# Patient Record
Sex: MALE | Race: ASIAN | NOT HISPANIC OR LATINO | URBAN - METROPOLITAN AREA
[De-identification: names, ages, dates, MRNs, and addresses within clinical notes are randomized per-mention and may not be internally consistent; named-entity substitution may affect disease eponyms.]

---

## 2022-03-14 ENCOUNTER — PREPPED CHART (OUTPATIENT)
Dept: URBAN - METROPOLITAN AREA CLINIC 80 | Facility: CLINIC | Age: 57
End: 2022-03-14

## 2022-03-14 PROBLEM — E11.3513 DIABETES, TYPE II WITH OCULAR COMPLICATIONS: Status: STABILIZING | Noted: 2022-03-14

## 2022-03-14 PROBLEM — H43.12 VITREOUS HEMORRHAGE: Noted: 2022-03-14

## 2022-03-14 PROBLEM — H33.192 BULLOUS RETINOSCHISIS: Noted: 2022-03-14

## 2022-03-14 PROBLEM — E11.3513 DIABETIC MACULAR EDEMA: Noted: 2022-03-14

## 2022-03-14 PROBLEM — H35.373 EPIRETINAL MEMBRANE: Status: STABILIZING | Noted: 2022-03-14

## 2022-03-14 PROBLEM — E11.3513 DIABETES, TYPE II WITH OCULAR COMPLICATIONS: Noted: 2022-03-14

## 2022-03-14 PROBLEM — E11.3513 PROLIFERATIVE DIABETIC RETINOPATHY: Noted: 2022-03-14

## 2022-03-14 PROBLEM — H35.373 EPIRETINAL MEMBRANE: Noted: 2022-03-14

## 2022-03-14 PROBLEM — H43.13 VITREOUS HEMORRHAGE: Status: IMPROVING | Noted: 2022-03-14

## 2022-03-14 PROBLEM — E11.3513 DIABETIC MACULAR EDEMA: Status: STABILIZING | Noted: 2022-03-14

## 2022-03-14 PROBLEM — E11.3513 PROLIFERATIVE DIABETIC RETINOPATHY: Status: STABILIZING | Noted: 2022-03-14

## 2022-03-14 PROBLEM — H33.192 BULLOUS RETINOSCHISIS: Status: STABILIZING | Noted: 2022-03-14

## 2022-03-14 PROBLEM — H43.13 VITREOUS HEMORRHAGE: Noted: 2022-03-14

## 2022-04-07 ASSESSMENT — TONOMETRY
OD_IOP_MMHG: 18
OS_IOP_MMHG: 18

## 2022-04-07 ASSESSMENT — VISUAL ACUITY
OS_SC: 20/40-1
OD_SC: 20/40-1

## 2022-04-18 ENCOUNTER — FOLLOW UP (OUTPATIENT)
Dept: URBAN - METROPOLITAN AREA CLINIC 80 | Facility: CLINIC | Age: 57
End: 2022-04-18

## 2022-04-18 DIAGNOSIS — H43.13: ICD-10-CM

## 2022-04-18 DIAGNOSIS — H33.192: ICD-10-CM

## 2022-04-18 DIAGNOSIS — H35.373: ICD-10-CM

## 2022-04-18 DIAGNOSIS — E11.3513: ICD-10-CM

## 2022-04-18 PROCEDURE — 92134 CPTRZ OPH DX IMG PST SGM RTA: CPT

## 2022-04-18 PROCEDURE — 92014 COMPRE OPH EXAM EST PT 1/>: CPT | Mod: 25,NC

## 2022-04-18 PROCEDURE — 67028 INJECTION EYE DRUG: CPT | Mod: 50,NC

## 2022-04-18 ASSESSMENT — VISUAL ACUITY
OD_SC: 20/30
OS_SC: 20/30

## 2022-04-18 ASSESSMENT — TONOMETRY
OS_IOP_MMHG: 21
OD_IOP_MMHG: 21

## 2022-06-13 ENCOUNTER — FOLLOW UP (OUTPATIENT)
Dept: URBAN - METROPOLITAN AREA CLINIC 80 | Facility: CLINIC | Age: 57
End: 2022-06-13

## 2022-06-13 DIAGNOSIS — H43.13: ICD-10-CM

## 2022-06-13 DIAGNOSIS — H35.373: ICD-10-CM

## 2022-06-13 DIAGNOSIS — H33.192: ICD-10-CM

## 2022-06-13 DIAGNOSIS — E11.3513: ICD-10-CM

## 2022-06-13 PROCEDURE — 92134 CPTRZ OPH DX IMG PST SGM RTA: CPT

## 2022-06-13 PROCEDURE — LUC3PFC LUCENTIS 0.3MG PREFILLED SYRINGE CHARITY

## 2022-06-13 PROCEDURE — 67028 INJECTION EYE DRUG: CPT | Mod: 50

## 2022-06-13 PROCEDURE — 92014 COMPRE OPH EXAM EST PT 1/>: CPT | Mod: 25

## 2022-06-13 ASSESSMENT — VISUAL ACUITY
OD_SC: 20/40
OD_PH: 20/25
OS_SC: 20/30
OS_PH: 20/25

## 2022-06-13 ASSESSMENT — TONOMETRY
OD_IOP_MMHG: 15
OS_IOP_MMHG: 18

## 2022-08-15 ENCOUNTER — FOLLOW UP (OUTPATIENT)
Dept: URBAN - METROPOLITAN AREA CLINIC 80 | Facility: CLINIC | Age: 57
End: 2022-08-15

## 2022-08-15 DIAGNOSIS — H33.192: ICD-10-CM

## 2022-08-15 DIAGNOSIS — H43.12: ICD-10-CM

## 2022-08-15 DIAGNOSIS — E11.3513: ICD-10-CM

## 2022-08-15 DIAGNOSIS — H35.373: ICD-10-CM

## 2022-08-15 PROCEDURE — 67028 INJECTION EYE DRUG: CPT | Mod: 50

## 2022-08-15 PROCEDURE — 92134 CPTRZ OPH DX IMG PST SGM RTA: CPT

## 2022-08-15 PROCEDURE — LUC3PFC LUCENTIS 0.3MG PREFILLED SYRINGE CHARITY: Mod: NC

## 2022-08-15 PROCEDURE — 92014 COMPRE OPH EXAM EST PT 1/>: CPT | Mod: 25

## 2022-08-15 ASSESSMENT — VISUAL ACUITY
OD_PH: 20/30
OS_PH: 20/25
OS_SC: 20/30-2
OD_SC: 20/40-1

## 2022-08-15 ASSESSMENT — TONOMETRY
OS_IOP_MMHG: 20
OD_IOP_MMHG: 18

## 2023-10-18 NOTE — ASU PATIENT PROFILE, ADULT - FALL HARM RISK - UNIVERSAL INTERVENTIONS
Bed in lowest position, wheels locked, appropriate side rails in place/Call bell, personal items and telephone in reach/Instruct patient to call for assistance before getting out of bed or chair/Non-slip footwear when patient is out of bed/[place] to call system/Physically safe environment - no spills, clutter or unnecessary equipment/Purposeful Proactive Rounding/Room/bathroom lighting operational, light cord in reach

## 2023-10-19 ENCOUNTER — OUTPATIENT (OUTPATIENT)
Dept: OUTPATIENT SERVICES | Facility: HOSPITAL | Age: 58
LOS: 1 days | Discharge: HOME | End: 2023-10-19

## 2023-10-19 VITALS
DIASTOLIC BLOOD PRESSURE: 83 MMHG | SYSTOLIC BLOOD PRESSURE: 136 MMHG | TEMPERATURE: 98 F | HEART RATE: 73 BPM | OXYGEN SATURATION: 97 % | RESPIRATION RATE: 16 BRPM

## 2023-10-19 VITALS
SYSTOLIC BLOOD PRESSURE: 118 MMHG | HEIGHT: 67 IN | OXYGEN SATURATION: 100 % | RESPIRATION RATE: 17 BRPM | WEIGHT: 151.24 LBS | TEMPERATURE: 97 F | HEART RATE: 78 BPM | DIASTOLIC BLOOD PRESSURE: 82 MMHG

## 2023-10-19 LAB
GLUCOSE BLDC GLUCOMTR-MCNC: 117 MG/DL — HIGH (ref 70–99)
GLUCOSE BLDC GLUCOMTR-MCNC: 117 MG/DL — HIGH (ref 70–99)

## 2023-10-19 DEVICE — TISSEEL 2ML: Type: IMPLANTABLE DEVICE | Site: LEFT | Status: FUNCTIONAL

## 2023-10-19 DEVICE — CLEAR PATH MODEL 250: Type: IMPLANTABLE DEVICE | Site: LEFT | Status: FUNCTIONAL

## 2023-10-19 RX ORDER — ACETAMINOPHEN 500 MG
650 TABLET ORAL ONCE
Refills: 0 | Status: COMPLETED | OUTPATIENT
Start: 2023-10-19 | End: 2023-10-19

## 2023-10-19 RX ORDER — METFORMIN HYDROCHLORIDE 850 MG/1
1 TABLET ORAL
Refills: 0 | DISCHARGE

## 2023-10-19 RX ORDER — SEMAGLUTIDE 0.68 MG/ML
0.5 INJECTION, SOLUTION SUBCUTANEOUS
Refills: 0 | DISCHARGE

## 2023-10-19 RX ORDER — ACETAMINOPHEN 500 MG
650 TABLET ORAL ONCE
Refills: 0 | Status: DISCONTINUED | OUTPATIENT
Start: 2023-10-19 | End: 2023-10-19

## 2023-10-19 RX ORDER — AMLODIPINE BESYLATE 2.5 MG/1
1 TABLET ORAL
Refills: 0 | DISCHARGE

## 2023-10-19 RX ORDER — ONDANSETRON 8 MG/1
4 TABLET, FILM COATED ORAL ONCE
Refills: 0 | Status: DISCONTINUED | OUTPATIENT
Start: 2023-10-19 | End: 2023-10-19

## 2023-10-19 RX ORDER — LOSARTAN POTASSIUM 100 MG/1
1 TABLET, FILM COATED ORAL
Refills: 0 | DISCHARGE

## 2023-10-19 RX ADMIN — Medication 650 MILLIGRAM(S): at 20:48

## 2023-10-19 RX ADMIN — Medication 650 MILLIGRAM(S): at 21:18

## 2023-10-19 NOTE — OPERATIVE REPORT - OPERATIVE RPOSRT DETAILS
DATE OF PROCEDURE: 10/19/2023    SURGEON: HANNAH KELLOGG MD     ANESTHESIA:  MAC and peribulbar injection.    PREOPERATIVE DIAGNOSIS(ES):  Severe glaucoma. Left eye    POSTOPERATIVE DIAGNOSIS(ES):  Severe glaucoma. Left eye.    OPERATION:  GLAUCOMA DRAINAGE IMPLANT WITH CORNEAL GRAFT. Left eye    IMPLANTS:  250 Ahmed ClearPath    SPECIMENS:  None.    COMPLICATIONS:  None.    ESTIMATED BLOOD LOSS: <1 cc      PROCEDURE:     Prior to the procedure all risks, benefits and alternatives were discussed with the patient, including but not limited to infection, bleeding, retinal detachment, increase or decrease in intraocular pressure, corneal edema, ptosis, diplopia, loss of vision, no improvement of vision, need for second surgery, etc. All questions were answered, and the patient wished to proceed with the surgery.  The patient was wheeled to the operating room and placed on the operating room table in a supine position. Peribulbar anesthesia was applied with a 40/60 mixture of lidocaine 4% and 0.75% bupivacaine. The eye was prepped and draped in the usual sterile fashion for intraocular surgery. An eyelid speculum was placed into the left eye. Topical 4% preservative free lidocaine was placed for topical anesthesia.  Using a conjunctival forceps and Vannas scissors, a superotemporal limbal peritomy was performed and extended for 3 clock hours. Johnathan scissors were then used to perform posterior, nasal, and temporal Tenon's dissection. Hemostasis was obtained using a wet-field cautery.  A 250 Ahmed ClearPath shunt was inserted in the superotemporal quadrant and secured to sclera with two 8-0 nylon sutures, 7 mm from the limbus. The tube was ligated with one 7-0 vicryl suture. Balanced salt solution was injected under pressure into the tube using a 30-gauge cannula and no patency was confirmed after the 7-0 vicryl suture. Then, a scleral groove was created 3 mm behind the limbus in the superotemporal quadrant with a Z-shaped 23-gauge needle, entered into the anterior chamber, and a small amount of [place] was injected into the anterior chamber and scleral groove. The tube was trimmed bevel up and inserted in the anterior chamber, remaining in a good position between the cornea and the iris.    Next, a 10-0 nylon suture was used to secure the tube to sclera. A 10-0 vicryl suture was then used to luu the tube, and a small thread was left as a wick. A piece of corneal graft was placed over the tube at the scleral entry site was secured to sclera with Tisseel glue. The conjunctiva and Tenon's fascia were then re-approximated, and the limbal incision was closed using 9-0 nylon sutures at either wing, and 10-0 nylon mattress suture at the limbus. 10-0 vicryl suture in a running locking fashion was used to close the conjunctiva temporally. Finally, two paracenteses were created superonasally and superotemporally, and bimanual irrigation/aspiration was used to remove the viscoelastic from the anterior chamber. The wounds were hydrated and noted to be watertight. The anterior chamber was noted to remain deep with no leakage coming from the wounds.  Antibiotics were used topically, and steroids were injected in the subconjunctival space. The eyelid speculum was removed and the eye was patched and shielded. The patient was wheeled to the recovery room in stable and excellent condition.

## 2023-10-19 NOTE — PRE-ANESTHESIA EVALUATION ADULT - WEIGHT IN LBS
151.2 Doxycycline Pregnancy And Lactation Text: This medication is Pregnancy Category D and not consider safe during pregnancy. It is also excreted in breast milk but is considered safe for shorter treatment courses.

## 2023-10-19 NOTE — ASU PREOP CHECKLIST - LOOSE TEETH
1. Antireflux measures: Avoid fried, fatty foods, alcohol, chocolate, coffee, tea,  soft drinks, peppermint and spearmint, spicy foods, tomatoes and tomato based foods, onion based foods, and smoking. Other antireflux measures include weight reduction if overweight, avoiding tight clothing around the abdomen, elevating the head of the bed 6 inches with blocks under the head board, and don't drink or eat before going to bed and avoid lying down immediately after meals.  2. Pantoprazole 40 mg 1 tablet by mouth in the am 30 minutes before breakfast.  3. Colonoscopy: Description of the procedure, risks, benefits, alternatives and options, including nonoperative options, were discussed with the patient in detail. The patient understands and wishes to proceed.   no

## 2023-11-13 NOTE — PRE-ANESTHESIA EVALUATION ADULT - NSANTHBPHIGHRD_ENT_A_CORE
Home meds/allergies verified. Contacted pharmacist-[facility] re: medication alert: Lasix d/t allergy to celebrex.  Pharmacist reviewed pt profile, ok to give.  Medicated for renal scan as ordered.   Yes

## (undated) DEVICE — DRSG STERISTRIPS 0.5 X 4"

## (undated) DEVICE — SUT VICRYL 10-0 12" CS160-6 DA

## (undated) DEVICE — SUT ETHILON 10-0 12" TG160-4

## (undated) DEVICE — ELCTR ERASER BI-P BVL 45DEG 18G

## (undated) DEVICE — DRAPE MICROSCOPE KNOB COVER SMALL (2 PCS)

## (undated) DEVICE — SUT VICRYL 7-0 18" TG160-8 DA

## (undated) DEVICE — ELCTR BIPOLAR CORD J&J 12FT DISP

## (undated) DEVICE — SUT ETHILON 8-0 12" TG100-8

## (undated) DEVICE — NDL HYPO NONSAFE 23G X 1" (TURQUOISE)

## (undated) DEVICE — KNIFE ALCON MVR V-LANCE 20G (WHITE)

## (undated) DEVICE — SUT ETHILON 10-0 12" CS160-6

## (undated) DEVICE — SUT VICRYL 7-0 18" TG140-8 DA

## (undated) DEVICE — APPLICATOR COTTON TIP 3" STERILE

## (undated) DEVICE — SUT VICRYL 8-0 12" TG140-8 DA